# Patient Record
Sex: FEMALE | Race: WHITE
[De-identification: names, ages, dates, MRNs, and addresses within clinical notes are randomized per-mention and may not be internally consistent; named-entity substitution may affect disease eponyms.]

---

## 2019-07-24 ENCOUNTER — HOSPITAL ENCOUNTER (OUTPATIENT)
Dept: HOSPITAL 59 - SUR | Age: 59
Discharge: HOME | End: 2019-07-24
Attending: PAIN MEDICINE
Payer: COMMERCIAL

## 2019-07-24 DIAGNOSIS — I10: ICD-10-CM

## 2019-07-24 DIAGNOSIS — M54.17: ICD-10-CM

## 2019-07-24 DIAGNOSIS — M54.16: ICD-10-CM

## 2019-07-24 DIAGNOSIS — M96.1: Primary | ICD-10-CM

## 2019-07-24 DIAGNOSIS — F17.210: ICD-10-CM

## 2019-07-24 DIAGNOSIS — M19.90: ICD-10-CM

## 2019-07-24 DIAGNOSIS — J41.0: ICD-10-CM

## 2019-07-24 PROCEDURE — 62368 ANALYZE SP INF PUMP W/REPROG: CPT

## 2019-07-24 NOTE — HISTORY AND PHYSICAL - FERRO
CHIEF COMPLAINT/HISTORY OF CHIEF COMPLAINT:  This patient with a history of an 
intractable post lumbar laminectomy syndrome has a spinal infusion system in 
place infusing Hydromorphone at 6 mg a day.  Over the last number of refills and
reprogramming's it was found that the patient had battery depletion.  She is 
here for a battery change without any parameter changes.  



PAST MEDICAL HISTORY:  Chronic gastritis.  She denies hypertension.  



MEDICATIONS ON ADMISSION:  List to be provided.  



ALLERGIES:  AMOXICILLIN.  



FAMILY/PSYCHOSOCIAL HISTORY:  Social history - Nonsmoker.  Denies social 
alcohol.  Family history - Hypothyroidism, asthma, diabetes, coronary artery 
disease, peripheral vascular disease, hypertension and cancer.  



SYSTEMS REVIEW:  The patient is appropriate in no acute distress.  The remainder
of the systems review is nonspecific.  



PHYSICAL EXAMINATION:  From chart height is 5'3", weight is 130.  No vital 
signs.  

HEENT:  Within normal limits.  

LUNGS:  Clear.

HEART:  Rapid and regular.  

ABDOMEN:  Nontender.  

MUSCULOSKELETAL:  Examination of the musculoskeletal system shows the pump at 
the left posterior gluteal margin.  The incisional site is intact.  There is no 
breakdown or cellulitis.  The underlying pain pattern is a chronic post 
laminectomy syndrome.  The incisional site for the laminectomy is identified.  
There is currently no lower extremity involvement.  

NEUROLOGIC:  Cranial nerves are intact.  



IMPRESSION:  

1.  POST LUMBAR LAMINECTOMY SYNDROME, ICD-10 CODE M96.1 WITH LUMBAR 
RADICULOPATHY,  ICD-10 CODE M54.16 AND M54.17.  



2.  IMPLANTED SPINAL INFUSION SYSTEM INFUSING OPIOID WITH BATTERY DEPLETION.  



PLAN:  The patient is here for pump batter change on an outpatient basis.  No 
parameter changes will be made to the spinal infusion.  The procedure will be 
considered outpatient although an overnight stay will be evaluated.  





JOB NUMBER:  [id number]

MTDD

## 2019-07-24 NOTE — OPERATIVE NOTE
DATE OF SURGERY: 07/24/2019 



PREOPERATIVE DIAGNOSES: 

1. Post lumbar laminectomy syndrome, ICD10 code M96.1 with radiculopathy, ICD10 
code M54.16 and M54.17. 

2. Implanted spinal infusion system infusing morphine with battery depletion. 



OPERATION:

1. Fluoroscopic-guided incision, subcutaneous dissection, removal, and 
replacement of programmable pump left posterior gluteal margin. 

2. Diagnostic myelography with radiologic supervision and interpretation. 

3. Programming of pump back to original parameters. 



SURGEON: Parish Gore, 



ANESTHESIA: Local with sedation. 



ANESTHESIA PROVIDER: Aaron Hazel



INDICATION: This patient presents with a history of intractable post lumbar 
laminectomy radiculopathy with a spinal infusion system infusing morphine. Over 
the last number of refills, battery depletion noted. She is here for battery 
change to rule out parameter changes. 



PROCEDURE: IV line, vital sign monitoring, IV sedation. Prepped and draped with 
sterile technique. The pump at the left posterior gluteal margin was infiltrated
with local. Incision made and subcutaneous dissection was conducted to open the 
pouch. The Dacron sleeve was opened. The pump was exteriorized,  from 
the indwelling catheter. A new pump prefilled morphine 25 mg/mL placed onto the 
field. The new pump was interfaced with the indwelling catheter. A curved 24-
gauge Ngo needle was inserted in the access port and 1 mL of catheter contents
was aspirated, clearing the catheter and an opioid mixture. The new pump was 
then placed in the existing pouch. The pouch was closed by way of Dacron and 
nonabsorbable suture. The incision was then closed using Stratafix suture, 2-0 
fascia, 3-0 skin, Dermabond closure. The pump was programmed back to the 
original parameters at 6.4 mg a day. All alarm values were reset. She was 
transported to the recovery room stable. No side effects of anesthesia. She was 
requesting discharge. 



DISCHARGE INSTRUCTIONS:

1. Sites to remain clean and dry. No showering or bathing in any way that would 
disrupt dressings. If it happens, contact the clinic. 

2. Standard medications resume including the antibiotic Levaquin 500 mg once a 
day for 14 days. 

3. Office to contact the patient in the next 12-24 hours to set up a time in the
next 7-10 days to evaluate the sites. Until then, she is to keep her activities 
controlled. She can shower with the Dermabond but not sit in water. All other 
instructions provided. Spinal opioid side effects of respiratory depression, 
nausea, vomiting, constipation, urinary retention, lightheadedness, or rash have
all been discussed and reviewed. 

SIS